# Patient Record
Sex: MALE | Race: WHITE | NOT HISPANIC OR LATINO | Employment: OTHER | URBAN - METROPOLITAN AREA
[De-identification: names, ages, dates, MRNs, and addresses within clinical notes are randomized per-mention and may not be internally consistent; named-entity substitution may affect disease eponyms.]

---

## 2019-11-18 PROBLEM — K50.119 CROHN'S DISEASE OF COLON WITH COMPLICATION: Status: ACTIVE | Noted: 2019-11-18

## 2020-09-23 ENCOUNTER — TELEPHONE (OUTPATIENT)
Dept: PHARMACY | Facility: CLINIC | Age: 60
End: 2020-09-23

## 2020-09-23 NOTE — TELEPHONE ENCOUNTER
No answer/VM to inform patient that [facility] received prescription for Stelara and prior authorization is required.  [facility] will be back in touch once insurance determination is received.

## 2020-09-29 NOTE — TELEPHONE ENCOUNTER
Sathya SWENSON approved under CASE ID [id number] effective 9/23/20 to 12/31/99. $8.95 copay. Forwarding to clinical pharmacist for consult & shipment.

## 2020-12-11 ENCOUNTER — SPECIALTY PHARMACY (OUTPATIENT)
Dept: PHARMACY | Facility: CLINIC | Age: 60
End: 2020-12-11

## 2020-12-11 NOTE — TELEPHONE ENCOUNTER
Patient called about Sathya. Advised patient that pharmacist from inflam team would call for an initial consult and set up shipment. Patient had no other questions or concerns.     Moises Mitchell, PharmD  Clinical Pharmacist  [facility]  P: [phone]

## 2020-12-11 NOTE — TELEPHONE ENCOUNTER
Specialty Pharmacy - Initial Clinical Assessment    Specialty Medication Orders Linked to Encounter      Most Recent Value   Medication #1  ustekinumab (STELARA) 90 mg/mL Syrg syringe (Order#[id number], Rx#[id number])      Stelara for Crohn's disease initial consult completed.   Pt confirmed shipping of Stelara on  to arrive on 20. Address and  verified. $8.95 copay in 004. Pt received infusion dose Stelara on 20 and next dose due 20. Sharps container added to shipment. Pt declined full initial consult with [facility]. He states that he is confident with self injection process since he's been on Humira pens and Cimzia PF syringes. Medication list was reviewed.    Subjective    Rodney Lama is a 60 y.o. male, who is followed by the specialty pharmacy service for management and education.    Recent Encounters     Date Type Provider Description    2020 Specialty Pharmacy Moises Mitchell PharmD Initial Clinical Assessment        Clinical call attempts since last clinical assessment   No call attempts found.     Today he received education before his first fill with [facility].    Current Outpatient Medications   Medication Sig    azaTHIOprine (IMURAN) 50 mg Tab TAKE 3 TABS BY MOUTH ONCE DAILY    esomeprazole (NEXIUM) 40 MG capsule Take 40 mg by mouth before breakfast.    loperamide HCl (IMODIUM A-D ORAL) Take 2 tablets by mouth as needed.    losartan (COZAAR) 50 MG tablet Take 1 tablet by mouth once daily.    multivit-min/FA/lycopen/lutein (CENTRUM SILVER MEN ORAL) Take 1 capsule by mouth once daily.    ondansetron (ZOFRAN) 4 MG tablet Take 4 mg by mouth as needed.    pravastatin (PRAVACHOL) 40 MG tablet     psyllium husk (METAMUCIL) 3.4 gram/5.4 gram Powd Take 1 Dose by mouth as needed.    ustekinumab (STELARA) 90 mg/mL Syrg syringe Inject 1 mL (90 mg total) into the skin every 8 weeks.   Last reviewed on 2020  9:17 AM by Rayne Moreno RN    Review of patient's  allergies indicates:   Allergen Reactions    Penicillins    Last reviewed on  12/11/2020 10:33 AM by Lulu Anders    Drug Interactions    Drug interactions evaluated: yes  Clinically relevant drug interactions identified: no  Provided the patient with educational material regarding drug interactions: not applicable         Adverse Effects    *All other systems reviewed and are negative       Assessment Questions - Documented Responses      Most Recent Value   Assessment   Medication Reconciliation completed for patient  Yes   During the past 4 weeks, has patient missed any activities due to condition or medication?  No   During the past 4 weeks, did patient have any of the following urgent care visits?  None   Goals of Therapy Status  Discussed (new start)   Welcome packet contents reviewed and discussed with patient?  Yes   Assesment completed?  Yes   Plan  Therapy continued   Do you need to open a clinical intervention (i-vent)?  No   Do you want to schedule first shipment?  Yes        Refill Questions - Documented Responses      Most Recent Value   Relationship to patient of person spoken to?  Self   HIPAA/medical authority confirmed?  Yes   Can the patient store medication/sharps container properly (at the correct temperature, away from children/pets, etc.)?  Yes   Can the patient call emergency services (911) in the event of an emergency?  Yes   Does the patient have any concerns or questions about taking or administering this medication as prescribed?  No   During the past 4 weeks, has patient missed any activities due to condition or medication?  No   During the past 4 weeks, did patient have any of the following urgent care visits?  None   How will the patient receive the medication?  Mail   When does the patient need to receive the medication?  12/28/20   Shipping Address  Home   Address in [facility] confirmed and updated if neccessary?  Yes   Expected Copay ($)  8.95   Is the patient able to afford  "the medication copay?  Yes   Payment Method  CC on file   Days supply of Refill  28   Refill activity completed?  Yes   Refill activity plan  Refill scheduled   Shipment/Pickup Date:  12/17/20          Objective    He has a past medical history of Hypertension.    Tried/failed medications: Humira, Cimzia, remicade, entyvio, imuran    BP Readings from Last 4 Encounters:   11/02/20 (!) 141/82   07/31/20 132/66   07/16/20 93/62   06/19/20 137/85     Ht Readings from Last 4 Encounters:   11/02/20 5' 8" (1.727 m)   07/31/20 5' 8" (1.727 m)   06/19/20 5' 8" (1.727 m)   05/22/20 5' 8" (1.727 m)     Wt Readings from Last 4 Encounters:   11/02/20 89.4 kg (197 lb)   07/31/20 88.5 kg (195 lb)   06/19/20 88 kg (194 lb)   05/22/20 89.4 kg (197 lb)     Recent Labs   Lab Result Units 11/02/20  0925   Hepatitis B Surface Ag  Negative     The goals of prescribed drug therapy management include:  · Supporting patient to meet the prescriber's medical treatment objectives  · Improving or maintaining quality of life  · Maintaining optimal therapy adherence  · Minimizing and managing side effects      Goals of Therapy Status: Discussed (new start)    Assessment/Plan  Patient plans to continue CD treatment with Stelara. Infusion dose received 11/2/20.       Indication, dosage, appropriateness, effectiveness, safety and convenience of his specialty medication(s) were reviewed today.     Patient Counseling    Counseled the patient on the following: doses and administration discussed, safe handling, storage, and disposal discussed, possible adverse effects and management discussed, possible drug and prescription drug interactions discussed, possible drug and OTC drug and food interactions discussed, lab monitoring and follow-up discussed, therapeutic rationale discussed, cost of medications and cost implications discussed, adherence and missed doses discussed, pharmacy contact information discussed     [facility] hours of operations and services " reviewed. Refill call in 7 weeks and follow-up assessment with clinical [facility] in 3 months. Pt voiced understanding. Pt reports no issues or side effects since receiving Stelara infusion 11/2/20. He had no further questions or concerns.     Gastro OV note 01/27/2020 and lab 7/31/20 reviewed. Therapy appropriate. No recent labs, to be reviewed after completion in Jan 2021.     Tasks added this encounter   2/15/2021 - Refill Call  3/4/2021 - Clinical - Follow Up Assesement (90 day)   Tasks due within next 3 months   No tasks due.     Lulu Anders, PharmD  [facility] - Specialty Pharmacy  [address] [place] [id number]  Phone: [phone]  Fax: [phone]

## 2021-02-19 ENCOUNTER — SPECIALTY PHARMACY (OUTPATIENT)
Dept: PHARMACY | Facility: CLINIC | Age: 61
End: 2021-02-19

## 2021-04-20 ENCOUNTER — SPECIALTY PHARMACY (OUTPATIENT)
Dept: PHARMACY | Facility: CLINIC | Age: 61
End: 2021-04-20

## 2021-06-17 ENCOUNTER — SPECIALTY PHARMACY (OUTPATIENT)
Dept: PHARMACY | Facility: CLINIC | Age: 61
End: 2021-06-17

## 2021-08-19 ENCOUNTER — SPECIALTY PHARMACY (OUTPATIENT)
Dept: PHARMACY | Facility: CLINIC | Age: 61
End: 2021-08-19

## 2021-08-19 DIAGNOSIS — K50.119 CROHN'S DISEASE OF COLON WITH COMPLICATION: Primary | ICD-10-CM

## 2021-10-20 ENCOUNTER — SPECIALTY PHARMACY (OUTPATIENT)
Dept: PHARMACY | Facility: CLINIC | Age: 61
End: 2021-10-20

## 2021-12-20 ENCOUNTER — SPECIALTY PHARMACY (OUTPATIENT)
Dept: PHARMACY | Facility: CLINIC | Age: 61
End: 2021-12-20

## 2022-02-18 ENCOUNTER — SPECIALTY PHARMACY (OUTPATIENT)
Dept: PHARMACY | Facility: CLINIC | Age: 62
End: 2022-02-18

## 2022-02-18 NOTE — TELEPHONE ENCOUNTER
Specialty Pharmacy - Refill Coordination    Specialty Medication Orders Linked to Encounter    Flowsheet Row Most Recent Value   Medication #1 ustekinumab (STELARA) 90 mg/mL Syrg syringe (Order#[id number], Rx#[id number])          Refill Questions - Documented Responses    Flowsheet Row Most Recent Value   Patient Availability and HIPAA Verification    Does patient want to proceed with activity? Yes   HIPAA/medical authority confirmed? Yes   Relationship to patient of person spoken to? Self   Refill Screening Questions    Changes to allergies? No   Changes to medications? No   New conditions since last clinic visit? No   Unplanned office visit, urgent care, ED, or hospital admission in the last 4 weeks? No   How does patient/caregiver feel medication is working? Good   Financial problems or insurance changes? No   How many doses of your specialty medications were missed in the last 4 weeks? 0   Would patient like to speak to a pharmacist? No   When does the patient need to receive the medication? 02/28/22   Refill Delivery Questions    How will the patient receive the medication? Mail   When does the patient need to receive the medication? 02/28/22   Shipping Address Home   Address in [facility] confirmed and updated if neccessary? Yes   Expected Copay ($) 9.85   Is the patient able to afford the medication copay? Yes   Payment Method CC on file   Days supply of Refill 56   Supplies needed? No supplies needed   Refill activity completed? Yes   Refill activity plan Refill scheduled   Shipment/Pickup Date: 02/23/22          Current Outpatient Medications   Medication Sig    azaTHIOprine (IMURAN) 50 mg Tab TAKE 3 TABS BY MOUTH ONCE DAILY    azathioprine 50 mg/mL Susp as directed    esomeprazole (NEXIUM) 40 MG capsule Take 40 mg by mouth before breakfast.    esomeprazole (NEXIUM) 40 MG capsule 1 capsule    fenofibrate (TRICOR) 145 MG tablet TAKE 1 TAB BY MOUTH ONCE DAILY    loperamide HCl (IMODIUM A-D ORAL)  Take 2 tablets by mouth as needed.    losartan (COZAAR) 50 MG tablet Take 1 tablet by mouth once daily.    losartan (COZAAR) 50 MG tablet 1 tablet    multivit-min/FA/lycopen/lutein (CENTRUM SILVER MEN ORAL) Take 1 capsule by mouth once daily.    pravastatin (PRAVACHOL) 40 MG tablet     pravastatin (PRAVACHOL) 40 MG tablet 1 tablet    psyllium husk 3.4 gram/5.4 gram Powd Take 1 Dose by mouth as needed.    ustekinumab (STELARA) 45 mg/0.5 mL Soln as directed    ustekinumab (STELARA) 90 mg/mL Syrg syringe Inject 1 mL (90 mg total) into the skin every 8 weeks.   Last reviewed on 2/15/2022  8:40 AM by Verena Horner RN    Review of patient's allergies indicates:   Allergen Reactions    Penicillins     Last reviewed on  2/15/2022 8:40 AM by Verena Horner      Tasks added this encounter   No tasks added.   Tasks due within next 3 months   2/18/2022 - Refill Call (Auto Added)     Terell Sabillon [facility] - Specialty Pharmacy  [address] [facility] [address]  Phone: [phone]  Fax: [phone]

## 2022-04-19 ENCOUNTER — SPECIALTY PHARMACY (OUTPATIENT)
Dept: PHARMACY | Facility: CLINIC | Age: 62
End: 2022-04-19

## 2022-04-19 DIAGNOSIS — K50.119 CROHN'S DISEASE OF COLON WITH COMPLICATION: Primary | ICD-10-CM

## 2022-04-19 NOTE — TELEPHONE ENCOUNTER
Specialty Pharmacy - Refill Coordination    Specialty Medication Orders Linked to Encounter    Flowsheet Row Most Recent Value   Medication #1 ustekinumab (STELARA) 90 mg/mL Syrg syringe (Order#[id number], Rx#[id number])          Refill Questions - Documented Responses    Flowsheet Row Most Recent Value   Patient Availability and HIPAA Verification    Does patient want to proceed with activity? Yes   HIPAA/medical authority confirmed? Yes   Relationship to patient of person spoken to? Self   Refill Screening Questions    Changes to allergies? No   Changes to medications? No   New conditions since last clinic visit? Yes  [diagnosed with colon cancer recently but has not started any treatment at this time]   Unplanned office visit, urgent care, ED, or hospital admission in the last 4 weeks? No   How does patient/caregiver feel medication is working? Good   Financial problems or insurance changes? No   How many doses of your specialty medications were missed in the last 4 weeks? 0   Would patient like to speak to a pharmacist? Yes   When does the patient need to receive the medication? 04/25/22   Refill Delivery Questions    How will the patient receive the medication? Mail   When does the patient need to receive the medication? 04/25/22   Shipping Address Home   Address in [facility] confirmed and updated if neccessary? Yes   Expected Copay ($) 0   Is the patient able to afford the medication copay? Yes   Payment Method zero copay   Days supply of Refill 56   Supplies needed? No supplies needed   Refill activity completed? Yes   Refill activity plan Refill scheduled   Shipment/Pickup Date: 04/21/22          Current Outpatient Medications   Medication Sig    azaTHIOprine (IMURAN) 50 mg Tab TAKE 3 TABS BY MOUTH ONCE DAILY    azaTHIOprine (IMURAN) 50 mg Tab as directed    azathioprine 50 mg/mL Susp as directed    esomeprazole (NEXIUM) 40 MG capsule Take 40 mg by mouth before breakfast.    esomeprazole  (NEXIUM) 40 MG capsule 1 capsule    esomeprazole (NEXIUM) 40 MG capsule 1 capsule    fenofibrate (TRICOR) 145 MG tablet TAKE 1 TAB BY MOUTH ONCE DAILY    fenofibrate (TRICOR) 145 MG tablet 1 tablet    loperamide HCl (IMODIUM A-D ORAL) Take 2 tablets by mouth as needed.    losartan (COZAAR) 25 MG tablet Take 50 mg by mouth once daily.    losartan (COZAAR) 50 MG tablet Take 1 tablet by mouth once daily.    losartan (COZAAR) 50 MG tablet 1 tablet    losartan (COZAAR) 50 MG tablet 1 tablet    multivit-min/FA/lycopen/lutein (CENTRUM SILVER MEN ORAL) Take 1 capsule by mouth once daily.    multivitamin-iron-folic acid (CENTRUM) Tab as directed    pravastatin (PRAVACHOL) 40 MG tablet     pravastatin (PRAVACHOL) 40 MG tablet 1 tablet    pravastatin (PRAVACHOL) 40 MG tablet 1 tablet    psyllium husk 3.4 gram/5.4 gram Powd Take 1 Dose by mouth as needed.    ustekinumab (STELARA) 45 mg/0.5 mL Soln as directed    ustekinumab (STELARA) 90 mg/mL Syrg syringe Inject 1 mL (90 mg total) into the skin every 8 weeks.   Last reviewed on 4/19/2022  6:08 PM by Yesenia Zee, PharmD    Review of patient's allergies indicates:   Allergen Reactions    Penicillins Other (See Comments)    Last reviewed on  4/19/2022 6:08 PM by Yesenia Zee      Tasks added this encounter   No tasks added.   Tasks due within next 3 months   4/12/2022 - Refill Call (Auto Added)     Yesenia Zee, PharmD  [facility] - Specialty Pharmacy  [address] [place] [address]  Phone: [phone]  Fax: [phone]

## 2022-05-02 PROBLEM — C20 RECTAL ADENOCARCINOMA: Status: ACTIVE | Noted: 2022-05-02

## 2022-06-09 ENCOUNTER — SPECIALTY PHARMACY (OUTPATIENT)
Dept: PHARMACY | Facility: CLINIC | Age: 62
End: 2022-06-09

## 2022-06-10 NOTE — TELEPHONE ENCOUNTER
Specialty Pharmacy - Clinical Reassessment  Specialty Pharmacy - Clinical Intervention    Specialty Medication Orders Linked to Encounter    Flowsheet Row Most Recent Value   Medication #1 ustekinumab (STELARA) 90 mg/mL Syrg syringe (Order#[id number], Rx#[id number])        Patient Diagnosis   K50.119 - Crohn's disease of colon with complication    Specialty clinical pharmacist review completed for an annual review of reassessment. Reviewed the following areas: current med list, reports of adverse effects, adherence and progress towards therapeutic goals.    Recommendations: Patient recently diagnosed had surgery for adenocarcinoma. No active prescription on file for Stelara 90mg. Dose due next week. Refill request sent.   Patient states that he is doing well after surgery.    Tasks added this encounter   3/10/2023 - Clinical - Follow Up Assesement (Annual)   Tasks due within next 3 months   6/11/2022 - Refill Call (Auto Added)     Maia Garcia, PharmD  [facility]  [address]  [facility] [address]  Phone: [phone]  Fax: [phone]

## 2022-06-14 ENCOUNTER — SPECIALTY PHARMACY (OUTPATIENT)
Dept: PHARMACY | Facility: CLINIC | Age: 62
End: 2022-06-14

## 2022-06-14 NOTE — TELEPHONE ENCOUNTER
Patient recently underwent surgery on 6/3 for rectal adenocarcinoma. Stelara dose is likely due this weekend. Messaged GI provider and surgeon to advise on whether patient is cleared to resume Stelara post-surgery. Patient has a follow up visit with surgeon tomorrow 6/15. Will follow up with patient once I hear from his providers.

## 2022-06-15 NOTE — TELEPHONE ENCOUNTER
GI provider recommends holding Stelara at this time until a long term plan is established for patient's malignancy. Patient's Crohn's is controlled at the moment. Will follow up with patient after visit with surgeon today.

## 2022-06-15 NOTE — TELEPHONE ENCOUNTER
Reached out to patient and advised of the information below. Will touch base again after visit with oncology on 6/27. Patient verbalized understanding.

## 2022-06-30 NOTE — TELEPHONE ENCOUNTER
Per colon surgery provider, patient may restart Stelara, however she is curious about oncology provider's plan for patient after visit on 6/27. Patient rescheduled visit for 6/27 with oncology provider to 7/13. Messaged GI and colon surgery providers to advise on next steps regarding Stelara therapy.

## 2022-07-11 NOTE — TELEPHONE ENCOUNTER
Specialty Pharmacy - Clinical Intervention  Specialty Pharmacy - Refill Coordination    Specialty Medication Orders Linked to Encounter    Flowsheet Row Most Recent Value   Medication #1 ustekinumab (STELARA) 90 mg/mL Syrg syringe (Order#[id number], Rx#[id number])          Refill Questions - Documented Responses    Flowsheet Row Most Recent Value   Patient Availability and HIPAA Verification    Does patient want to proceed with activity? Yes   HIPAA/medical authority confirmed? Yes   Relationship to patient of person spoken to? Self   Refill Screening Questions    Changes to allergies? No   Changes to medications? No   New conditions since last clinic visit? No   Unplanned office visit, urgent care, ED, or hospital admission in the last 4 weeks? No   How does patient/caregiver feel medication is working? Very good   Financial problems or insurance changes? No   How many doses of your specialty medications were missed in the last 4 weeks? 1   Why were doses missed? Other (comment)  [See i-vent]   Would patient like to speak to a pharmacist? No   When does the patient need to receive the medication? 07/12/22   Refill Delivery Questions    How will the patient receive the medication? Mail   When does the patient need to receive the medication? 07/12/22   Shipping Address Home   Address in [facility] confirmed and updated if neccessary? Yes   Expected Copay ($) 0   Is the patient able to afford the medication copay? Yes   Payment Method zero copay   Days supply of Refill 56   Supplies needed? Alcohol Swabs   Refill activity completed? Yes   Refill activity plan Refill scheduled   Shipment/Pickup Date: 07/11/22          Current Outpatient Medications   Medication Sig    ALPRAZolam (XANAX) 0.25 MG tablet Take by mouth 3 (three) times daily.    azaTHIOprine (IMURAN) 50 mg Tab TAKE 3 TABS BY MOUTH ONCE DAILY    esomeprazole (NEXIUM) 40 MG capsule Take 40 mg by mouth before breakfast.    HYDROcodone-acetaminophen  (NORCO)  mg per tablet Take 1 tablet by mouth every 6 (six) hours as needed for Pain. (Patient not taking: Reported on 6/15/2022)    lactulose (CHRONULAC) 10 gram/15 mL solution take 30 ML BY MOUTH 3 TIMES DAILY FOR 3 DAYS take until you have a good bm    loperamide HCl (IMODIUM A-D ORAL) Take 2 tablets by mouth as needed.    losartan (COZAAR) 25 MG tablet Take 50 mg by mouth once daily.    multivit-min/FA/lycopen/lutein (CENTRUM SILVER MEN ORAL) Take 1 capsule by mouth once daily.    multivitamin-iron-folic acid (CENTRUM) Tab as directed    paroxetine (PAXIL) 20 MG tablet Take 20 mg by mouth every morning.    polyethylene glycol (GLYCOLAX) 17 gram PwPk Take 17 g by mouth once daily.    pravastatin (PRAVACHOL) 40 MG tablet     psyllium husk 3.4 gram/5.4 gram Powd Take 1 Dose by mouth as needed.    ustekinumab (STELARA) 90 mg/mL Syrg syringe Inject 1 mL (90 mg total) into the skin every 8 weeks. (Patient not taking: Reported on 6/15/2022)   Last reviewed on 6/15/2022  9:19 AM by Nissa Castro MA    Review of patient's allergies indicates:   Allergen Reactions    Penicillins Other (See Comments)    Last reviewed on  6/15/2022 9:14 AM by Nissa Castro    Interventions added this encounter   Closed: [facility] Provider Intervention - Drug therapy appropriateness: ustekinumab (STELARA) 90 mg/mL Syrg syringe     Tasks added this encounter   8/30/2022 - Refill Call (Auto Added)   Tasks due within next 3 months   No tasks due.     Corona Marlow, PharmD  [facility]  [address] [facility] [address]  Phone: [phone]  Fax: [phone]

## 2022-08-30 ENCOUNTER — SPECIALTY PHARMACY (OUTPATIENT)
Dept: PHARMACY | Facility: CLINIC | Age: 62
End: 2022-08-30

## 2022-08-30 NOTE — TELEPHONE ENCOUNTER
Specialty Pharmacy - Refill Coordination    Specialty Medication Orders Linked to Encounter      Flowsheet Row Most Recent Value   Medication #1 ustekinumab (STELARA) 90 mg/mL Syrg syringe (Order#[id number], Rx#[id number])            Refill Questions - Documented Responses      Flowsheet Row Most Recent Value   Patient Availability and HIPAA Verification    Does patient want to proceed with activity? Yes   HIPAA/medical authority confirmed? Yes   Relationship to patient of person spoken to? Self   Refill Screening Questions    Changes to allergies? No   Changes to medications? No   New conditions since last clinic visit? No   Unplanned office visit, urgent care, ED, or hospital admission in the last 4 weeks? No   How does patient/caregiver feel medication is working? Good   Financial problems or insurance changes? No   How many doses of your specialty medications were missed in the last 4 weeks? 0   Would patient like to speak to a pharmacist? No   When does the patient need to receive the medication? 09/09/22   Refill Delivery Questions    How will the patient receive the medication? Mail   When does the patient need to receive the medication? 09/09/22   Shipping Address Home   Address in [facility] confirmed and updated if neccessary? Yes   Expected Copay ($) 0   Is the patient able to afford the medication copay? Yes   Payment Method zero copay   Days supply of Refill 56   Supplies needed? No supplies needed   Refill activity completed? Yes   Refill activity plan Refill scheduled   Shipment/Pickup Date: 09/01/22            Current Outpatient Medications   Medication Sig    ALPRAZolam (XANAX) 0.25 MG tablet Take by mouth 3 (three) times daily.    azaTHIOprine (IMURAN) 50 mg Tab TAKE 3 TABS BY MOUTH ONCE DAILY    esomeprazole (NEXIUM) 40 MG capsule Take 40 mg by mouth before breakfast.    HYDROcodone-acetaminophen (NORCO)  mg per tablet Take 1 tablet by mouth every 6 (six) hours as needed for Pain.  (Patient not taking: Reported on 6/15/2022)    lactulose (CHRONULAC) 10 gram/15 mL solution take 30 ML BY MOUTH 3 TIMES DAILY FOR 3 DAYS take until you have a good bm    loperamide HCl (IMODIUM A-D ORAL) Take 2 tablets by mouth as needed.    losartan (COZAAR) 25 MG tablet Take 50 mg by mouth once daily.    multivit-min/FA/lycopen/lutein (CENTRUM SILVER MEN ORAL) Take 1 capsule by mouth once daily.    multivitamin-iron-folic acid (CENTRUM) Tab as directed    paroxetine (PAXIL) 20 MG tablet Take 20 mg by mouth every morning.    polyethylene glycol (GLYCOLAX) 17 gram PwPk Take 17 g by mouth once daily.    pravastatin (PRAVACHOL) 40 MG tablet     psyllium husk 3.4 gram/5.4 gram Powd Take 1 Dose by mouth as needed.    ustekinumab (STELARA) 90 mg/mL Syrg syringe Inject 1 mL (90 mg total) into the skin every 8 weeks. (Patient not taking: Reported on 6/15/2022)   Last reviewed on 6/15/2022  9:19 AM by Nissa Castro MA    Review of patient's allergies indicates:   Allergen Reactions    Penicillins Other (See Comments)    Last reviewed on  6/15/2022 9:14 AM by Nissa Castro    Pt did require some prompting to recall last dose that was injected.     Tasks added this encounter   10/22/2022 - Refill Call (Auto Added)   Tasks due within next 3 months   No tasks due.     Francesca Brown, PharmD  [facility]  [facility] [address]  [facility] [address]  Phone: [phone]  Fax: [phone]

## 2022-10-24 ENCOUNTER — SPECIALTY PHARMACY (OUTPATIENT)
Dept: PHARMACY | Facility: CLINIC | Age: 62
End: 2022-10-24

## 2022-10-24 NOTE — TELEPHONE ENCOUNTER
Specialty Pharmacy - Refill Coordination    Specialty Medication Orders Linked to Encounter      Flowsheet Row Most Recent Value   Medication #1 ustekinumab (STELARA) 90 mg/mL Syrg syringe (Order#[id number], Rx#[id number])            Refill Questions - Documented Responses      Flowsheet Row Most Recent Value   Patient Availability and HIPAA Verification    Does patient want to proceed with activity? Yes   HIPAA/medical authority confirmed? Yes   Relationship to patient of person spoken to? Self   Refill Screening Questions    Changes to allergies? No   Changes to medications? No   New conditions since last clinic visit? No   Unplanned office visit, urgent care, ED, or hospital admission in the last 4 weeks? No   How does patient/caregiver feel medication is working? Good   Financial problems or insurance changes? No   How many doses of your specialty medications were missed in the last 4 weeks? 0   Would patient like to speak to a pharmacist? No   When does the patient need to receive the medication? 10/30/22   Refill Delivery Questions    How will the patient receive the medication? Mail   When does the patient need to receive the medication? 10/30/22   Shipping Address Home   Address in [facility] confirmed and updated if neccessary? Yes   Expected Copay ($) 0   Is the patient able to afford the medication copay? Yes   Payment Method zero copay   Days supply of Refill 56   Supplies needed? No supplies needed   Refill activity completed? Yes   Refill activity plan Refill scheduled   Shipment/Pickup Date: 10/26/22            Current Outpatient Medications   Medication Sig    ALPRAZolam (XANAX) 0.25 MG tablet Take by mouth 3 (three) times daily.    azaTHIOprine (IMURAN) 50 mg Tab TAKE 3 TABS BY MOUTH ONCE DAILY    esomeprazole (NEXIUM) 40 MG capsule Take 40 mg by mouth before breakfast.    HYDROcodone-acetaminophen (NORCO)  mg per tablet Take 1 tablet by mouth every 6 (six) hours as needed for Pain.  (Patient not taking: Reported on 6/15/2022)    lactulose (CHRONULAC) 10 gram/15 mL solution take 30 ML BY MOUTH 3 TIMES DAILY FOR 3 DAYS take until you have a good bm    loperamide HCl (IMODIUM A-D ORAL) Take 2 tablets by mouth as needed.    losartan (COZAAR) 25 MG tablet Take 50 mg by mouth once daily.    multivit-min/FA/lycopen/lutein (CENTRUM SILVER MEN ORAL) Take 1 capsule by mouth once daily.    multivitamin-iron-folic acid (CENTRUM) Tab as directed    paroxetine (PAXIL) 20 MG tablet Take 20 mg by mouth every morning.    polyethylene glycol (GLYCOLAX) 17 gram PwPk Take 17 g by mouth once daily.    pravastatin (PRAVACHOL) 40 MG tablet     psyllium husk 3.4 gram/5.4 gram Powd Take 1 Dose by mouth as needed.    ustekinumab (STELARA) 90 mg/mL Syrg syringe Inject 1 mL (90 mg total) into the skin every 8 weeks. (Patient not taking: Reported on 6/15/2022)   Last reviewed on 6/15/2022  9:19 AM by Nissa Castro MA    Review of patient's allergies indicates:   Allergen Reactions    Penicillins Other (See Comments)    Last reviewed on  6/15/2022 9:14 AM by Nissa Castro      Tasks added this encounter   12/16/2022 - Refill Call (Auto Added)   Tasks due within next 3 months   No tasks due.     Nieves Martell, PharmD  [facility]  [address] [place], [place] [address]  [facility] [address]  Phone: [phone]  Fax: [phone]

## 2022-12-16 ENCOUNTER — SPECIALTY PHARMACY (OUTPATIENT)
Dept: PHARMACY | Facility: CLINIC | Age: 62
End: 2022-12-16

## 2022-12-16 NOTE — TELEPHONE ENCOUNTER
Specialty Pharmacy - Refill Coordination    Specialty Medication Orders Linked to Encounter      Flowsheet Row Most Recent Value   Medication #1 ustekinumab (STELARA) 90 mg/mL Syrg syringe (Order#[id number], Rx#[id number])            Refill Questions - Documented Responses      Flowsheet Row Most Recent Value   Patient Availability and HIPAA Verification    Does patient want to proceed with activity? Yes   HIPAA/medical authority confirmed? Yes   Relationship to patient of person spoken to? Self   Refill Screening Questions    Changes to allergies? No   Changes to medications? No   New conditions since last clinic visit? No   Unplanned office visit, urgent care, ED, or hospital admission in the last 4 weeks? No   How does patient/caregiver feel medication is working? Good   Financial problems or insurance changes? No   How many doses of your specialty medications were missed in the last 4 weeks? 0   Would patient like to speak to a pharmacist? No   When does the patient need to receive the medication? 12/23/22   Refill Delivery Questions    How will the patient receive the medication? Mail   When does the patient need to receive the medication? 12/23/22   Shipping Address Home   Address in [facility] confirmed and updated if neccessary? Yes   Expected Copay ($) 0   Is the patient able to afford the medication copay? Yes   Payment Method zero copay   Days supply of Refill 56   Supplies needed? No supplies needed   Refill activity completed? Yes   Refill activity plan Refill scheduled   Shipment/Pickup Date: 12/20/22            Current Outpatient Medications   Medication Sig    ALPRAZolam (XANAX) 0.25 MG tablet Take by mouth 3 (three) times daily.    azaTHIOprine (IMURAN) 50 mg Tab TAKE 3 TABS BY MOUTH ONCE DAILY    esomeprazole (NEXIUM) 40 MG capsule Take 40 mg by mouth before breakfast.    HYDROcodone-acetaminophen (NORCO)  mg per tablet Take 1 tablet by mouth every 6 (six) hours as needed for Pain.  (Patient not taking: Reported on 6/15/2022)    lactulose (CHRONULAC) 10 gram/15 mL solution take 30 ML BY MOUTH 3 TIMES DAILY FOR 3 DAYS take until you have a good bm    loperamide HCl (IMODIUM A-D ORAL) Take 2 tablets by mouth as needed.    losartan (COZAAR) 25 MG tablet Take 50 mg by mouth once daily.    multivit-min/FA/lycopen/lutein (CENTRUM SILVER MEN ORAL) Take 1 capsule by mouth once daily.    multivitamin-iron-folic acid (CENTRUM) Tab as directed    paroxetine (PAXIL) 20 MG tablet Take 20 mg by mouth every morning.    polyethylene glycol (GLYCOLAX) 17 gram PwPk Take 17 g by mouth once daily.    pravastatin (PRAVACHOL) 40 MG tablet     psyllium husk 3.4 gram/5.4 gram Powd Take 1 Dose by mouth as needed.    ustekinumab (STELARA) 90 mg/mL Syrg syringe Inject 1 mL (90 mg total) into the skin every 8 weeks. (Patient not taking: Reported on 6/15/2022)   Last reviewed on 6/15/2022  9:19 AM by Nissa Castro MA    Review of patient's allergies indicates:   Allergen Reactions    Penicillins Other (See Comments)    Last reviewed on  6/15/2022 9:14 AM by Nissa Castro      Tasks added this encounter   2/7/2023 - Refill Call (Auto Added)   Tasks due within next 3 months   3/10/2023 - Clinical - Follow Up Assesement (Annual)     [facility]  [address] [place], [place] [address]  [facility] [address]  Phone: [phone]  Fax: [phone]

## 2023-02-07 ENCOUNTER — SPECIALTY PHARMACY (OUTPATIENT)
Dept: PHARMACY | Facility: CLINIC | Age: 63
End: 2023-02-07

## 2023-02-07 NOTE — TELEPHONE ENCOUNTER
Specialty Pharmacy - Refill Coordination    Specialty Medication Orders Linked to Encounter      Flowsheet Row Most Recent Value   Medication #1 ustekinumab (STELARA) 90 mg/mL Syrg syringe (Order#[id number], Rx#[id number])            Refill Questions - Documented Responses      Flowsheet Row Most Recent Value   Patient Availability and HIPAA Verification    Does patient want to proceed with activity? Yes   HIPAA/medical authority confirmed? Yes   Relationship to patient of person spoken to? Self   Refill Screening Questions    Changes to allergies? No   Changes to medications? No   New conditions since last clinic visit? No   Unplanned office visit, urgent care, ED, or hospital admission in the last 4 weeks? No   How does patient/caregiver feel medication is working? Good   Financial problems or insurance changes? No   How many doses of your specialty medications were missed in the last 4 weeks? 0   Would patient like to speak to a pharmacist? No   When does the patient need to receive the medication? 02/14/23   Refill Delivery Questions    How will the patient receive the medication? Mail   When does the patient need to receive the medication? 02/14/23   Shipping Address Home   Address in [facility] confirmed and updated if neccessary? Yes   Expected Copay ($) 10.35   Is the patient able to afford the medication copay? Yes   Payment Method CC on file   Days supply of Refill 56   Supplies needed? No supplies needed   Refill activity completed? Yes   Refill activity plan Refill scheduled   Shipment/Pickup Date: 02/09/23            Current Outpatient Medications   Medication Sig    ALPRAZolam (XANAX) 0.25 MG tablet Take by mouth 3 (three) times daily.    azaTHIOprine (IMURAN) 50 mg Tab TAKE 3 TABS BY MOUTH ONCE DAILY    esomeprazole (NEXIUM) 40 MG capsule Take 40 mg by mouth before breakfast.    HYDROcodone-acetaminophen (NORCO)  mg per tablet Take 1 tablet by mouth every 6 (six) hours as needed for Pain.  (Patient not taking: Reported on 6/15/2022)    lactulose (CHRONULAC) 10 gram/15 mL solution take 30 ML BY MOUTH 3 TIMES DAILY FOR 3 DAYS take until you have a good bm    loperamide HCl (IMODIUM A-D ORAL) Take 2 tablets by mouth as needed.    losartan (COZAAR) 25 MG tablet Take 50 mg by mouth once daily.    multivit-min/FA/lycopen/lutein (CENTRUM SILVER MEN ORAL) Take 1 capsule by mouth once daily.    multivitamin-iron-folic acid (CENTRUM) Tab as directed    paroxetine (PAXIL) 20 MG tablet Take 20 mg by mouth every morning.    polyethylene glycol (GLYCOLAX) 17 gram PwPk Take 17 g by mouth once daily.    pravastatin (PRAVACHOL) 40 MG tablet     psyllium husk 3.4 gram/5.4 gram Powd Take 1 Dose by mouth as needed.    ustekinumab (STELARA) 90 mg/mL Syrg syringe Inject 1 mL (90 mg total) into the skin every 8 weeks. (Patient not taking: Reported on 6/15/2022)   Last reviewed on 6/15/2022  9:19 AM by Nissa Castro MA    Review of patient's allergies indicates:   Allergen Reactions    Penicillins Other (See Comments)    Last reviewed on  6/15/2022 9:14 AM by Nissa Castro      Tasks added this encounter   4/1/2023 - Refill Call (Auto Added)   Tasks due within next 3 months   3/10/2023 - Clinical - Follow Up Assesement (Annual)     Nieves Martell, PharmD  [facility]  [address] [place], [place] [address]  [facility] [address]  Phone: [phone]  Fax: [phone]

## 2023-04-03 ENCOUNTER — SPECIALTY PHARMACY (OUTPATIENT)
Dept: PHARMACY | Facility: CLINIC | Age: 63
End: 2023-04-03

## 2023-04-03 NOTE — TELEPHONE ENCOUNTER
Specialty Pharmacy - Clinical Reassessment    Specialty Medication Orders Linked to Encounter      Flowsheet Row Most Recent Value   Medication #1 ustekinumab (STELARA) 90 mg/mL Syrg syringe (Order#[id number], Rx#[id number])          Patient Diagnosis   K50.119 - Crohn's disease of colon with complication    Rodney Lama is a 62 y.o. male, who is followed by the specialty pharmacy service for management and education of his Stelara.  He has been on therapy with Stelara for 2.5 years.  I have reviewed his electronic medical record and current medication list and determined that specialty medication adjustment Is not needed at this time.    Patient has not experienced adverse events.  He Is adherent reporting 1 missed doses since last review.  Adherence has been encouraged with the following mechanism(s): proactive refill calls.  He is meeting goals of therapy and will continue treatment.        2/7/2023 12/16/2022 10/24/2022 8/30/2022 7/11/2022 4/19/2022 2/18/2022   Follow Up Review   # of missed doses 0 0 0 0 1 0 0   Reason     Other (comment)     New Medications? No No No No No No No   New Conditions? No No No No No Yes No   New Allergies? No No No No No No No   Med Effective? Good Good Good Good Very good Good Good   Urgent Care? No No No No No No No   Requested Pharmacist? No No No No No Yes No            Therapy is appropriate to continue.    Therapy is effective: Yes  On scale of 1 to 10, how does patient rank quality of life? (10 - Best): Unable to Assess  Recommendations:  Due for follow up/labs; remind patient to contact GI clinic to schedule an appt.  Review Method: Chart Review    Tasks added this encounter   1/3/2024 - Clinical - Follow Up Assesement (Annual)   Tasks due within next 3 months   4/1/2023 - Refill Call (Auto Added)     Corona Marlow, PharmD  [facility]  [address] [place] [address]  Phone: [phone]  Fax: [phone]

## 2023-04-06 ENCOUNTER — SPECIALTY PHARMACY (OUTPATIENT)
Dept: PHARMACY | Facility: CLINIC | Age: 63
End: 2023-04-06

## 2023-04-06 NOTE — TELEPHONE ENCOUNTER
Specialty Pharmacy - Refill Coordination    Specialty Medication Orders Linked to Encounter      Flowsheet Row Most Recent Value   Medication #1 ustekinumab (STELARA) 90 mg/mL Syrg syringe (Order#[id number], Rx#[id number])            Refill Questions - Documented Responses      Flowsheet Row Most Recent Value   Patient Availability and HIPAA Verification    Does patient want to proceed with activity? Yes   HIPAA/medical authority confirmed? Yes   Relationship to patient of person spoken to? Self   Refill Screening Questions    Changes to allergies? No   Changes to medications? No   New conditions since last clinic visit? No   Unplanned office visit, urgent care, ED, or hospital admission in the last 4 weeks? No   How does patient/caregiver feel medication is working? Very good   Financial problems or insurance changes? No   How many doses of your specialty medications were missed in the last 4 weeks? 0   Would patient like to speak to a pharmacist? No   When does the patient need to receive the medication? 04/08/23   Refill Delivery Questions    How will the patient receive the medication? Mail   When does the patient need to receive the medication? 04/08/23   Shipping Address Home   Address in [facility] confirmed and updated if neccessary? Yes   Expected Copay ($) 0   Is the patient able to afford the medication copay? Yes   Payment Method zero copay   Days supply of Refill 56   Supplies needed? Alcohol Swabs   Refill activity completed? Yes   Refill activity plan Refill scheduled   Shipment/Pickup Date: 04/06/23            Current Outpatient Medications   Medication Sig    ALPRAZolam (XANAX) 0.25 MG tablet Take by mouth 3 (three) times daily.    azaTHIOprine (IMURAN) 50 mg Tab TAKE 3 TABS BY MOUTH ONCE DAILY    esomeprazole (NEXIUM) 40 MG capsule Take 40 mg by mouth before breakfast.    losartan (COZAAR) 25 MG tablet Take 50 mg by mouth once daily.    multivit-min/FA/lycopen/lutein (CENTRUM SILVER MEN  ORAL) Take 1 capsule by mouth once daily.    multivitamin-iron-folic acid (CENTRUM) Tab as directed    paroxetine (PAXIL) 20 MG tablet Take 20 mg by mouth every morning.    polyethylene glycol (GLYCOLAX) 17 gram PwPk Take 17 g by mouth once daily.    pravastatin (PRAVACHOL) 40 MG tablet     psyllium husk 3.4 gram/5.4 gram Powd Take 1 Dose by mouth as needed.    ustekinumab (STELARA) 90 mg/mL Syrg syringe Inject 1 mL (90 mg total) into the skin every 8 weeks.   Last reviewed on 4/3/2023 11:32 AM by Corona Marlow PharmD    Review of patient's allergies indicates:   Allergen Reactions    Penicillins Other (See Comments)    Last reviewed on  4/3/2023 11:31 AM by Corona Marlow      Tasks added this encounter   5/27/2023 - Refill Call (Auto Added)   Tasks due within next 3 months   No tasks due.     Tremayne MastersonD  [facility]  [address] [place] [address]  Phone: [phone]  Fax: [phone]

## 2023-06-06 ENCOUNTER — SPECIALTY PHARMACY (OUTPATIENT)
Dept: PHARMACY | Facility: CLINIC | Age: 63
End: 2023-06-06

## 2023-06-06 NOTE — TELEPHONE ENCOUNTER
Specialty Pharmacy - Refill Coordination    Specialty Medication Orders Linked to Encounter      Flowsheet Row Most Recent Value   Medication #1 ustekinumab (STELARA) 90 mg/mL Syrg syringe (Order#[id number], Rx#[id number])          Refill Questions - Documented Responses      Flowsheet Row Most Recent Value   Patient Availability and HIPAA Verification    Does patient want to proceed with activity? Unable to Reach          We have made multiple attempts to the patient and have been unsuccessful to reach the patient. We will stop reaching out to the patient but in the event that the patient needs the med and contacts us, we will communicate and begin dispensing for the patient. At your next visit with the patient, please review the importance of being in contact with our specialty pharmacy as a part of our care team.    Interventions added this encounter   Closed: [facility] Provider Intervention - Drug therapy adherence: ustekinumab (STELARA) 90 mg/mL Syrg syringe     Corona Marlow, PharmD  [facility]  [address] [place] [address]  Phone: [phone]  Fax: [phone]

## 2023-06-06 NOTE — TELEPHONE ENCOUNTER
Specialty Pharmacy - Refill Coordination    Specialty Medication Orders Linked to Encounter      Flowsheet Row Most Recent Value   Medication #1 ustekinumab (STELARA) 90 mg/mL Syrg syringe (Order#[id number], Rx#[id number])                Current Outpatient Medications   Medication Sig    ALPRAZolam (XANAX) 0.25 MG tablet 1 tablet Orally Twice a day    azaTHIOprine (IMURAN) 50 mg Tab TAKE 3 TABS BY MOUTH ONCE DAILY (Patient not taking: Reported on 5/12/2023)    cyproheptadine (PERIACTIN) 4 mg tablet Take 4 mg by mouth 3 (three) times daily.    esomeprazole (NEXIUM) 40 MG capsule Take 40 mg by mouth before breakfast.    fenofibrate (TRICOR) 145 MG tablet 1 tablet Orally Once a day for 30 day(s)    gabapentin (NEURONTIN) 300 MG capsule TAKE 1 TAB BY MOUTH AT BEDTIME Oral for 30 Days    hydrOXYzine pamoate (VISTARIL) 50 MG Cap TAKE 1 CAP BY MOUTH ONCE DAILY AT BEDTIME for 30    LORazepam (ATIVAN) 1 MG tablet 1 tablet at bedtime as needed Orally Once a day    losartan (COZAAR) 50 MG tablet Take 50 mg by mouth.    mirtazapine (REMERON) 30 MG tablet Take 30 mg by mouth every evening.    multivit-min/FA/lycopen/lutein (CENTRUM SILVER MEN ORAL) Take 1 capsule by mouth once daily.    multivitamin-iron-folic acid (CENTRUM) Tab as directed    paroxetine (PAXIL) 20 MG tablet Take 20 mg by mouth every morning.    polyethylene glycol (GLYCOLAX) 17 gram PwPk Take 17 g by mouth once daily. (Patient not taking: Reported on 5/12/2023)    pravastatin (PRAVACHOL) 40 MG tablet TAKE 1 TAB BY MOUTH ONCE DAILY FOR CHOLESTEROL Orally Once a day for 30 days    psyllium husk 0.4 gram Cap 1 TAB PO DAILY for 90 days    ustekinumab (STELARA) 90 mg/mL Syrg syringe Inject 1 mL (90 mg total) into the skin every 8 weeks.   Last reviewed on 5/12/2023  1:47 PM by Erika Alfaro MA    Review of patient's allergies indicates:   Allergen Reactions    Penicillins Other (See Comments)    Last reviewed on  5/12/2023 1:42 PM by Erika Hartman  added this encounter   Closed: [facility] Provider Intervention - Drug therapy adherence: ustekinumab (STELARA) 90 mg/mL Syrg syringe     Tasks added this encounter   No tasks added.   Tasks due within next 3 months   6/13/2023 - Refill Coordination Outreach (1 time occurrence)     Corona Marlow, PharmD  [facility]  [address] [place], [place] [address]  [facility] [address]  Phone: [phone]  Fax: [phone]

## 2023-06-06 NOTE — TELEPHONE ENCOUNTER
Reminded patient [facility] makes 3 call attempts for refills and to please call pharmacy back if unable to answer. Patient states he was busy with other things, patient will inject medication when received.     Specialty Pharmacy - Refill Coordination    Specialty Medication Orders Linked to Encounter      Flowsheet Row Most Recent Value   Medication #1 ustekinumab (STELARA) 90 mg/mL Syrg syringe (Order#[id number], Rx#[id number])            Refill Questions - Documented Responses      Flowsheet Row Most Recent Value   Refill Screening Questions    Changes to allergies? No   Changes to medications? No   New conditions since last clinic visit? No   Unplanned office visit, urgent care, ED, or hospital admission in the last 4 weeks? No   How does patient/caregiver feel medication is working? Very good   Financial problems or insurance changes? No   How many doses of your specialty medications were missed in the last 4 weeks? 0  [patient will be 4 days late on next dose]   Why were doses missed? Busy with other things   Would patient like to speak to a pharmacist? No   When does the patient need to receive the medication? 06/07/23   Refill Delivery Questions    How will the patient receive the medication? Mail   When does the patient need to receive the medication? 06/07/23   Shipping Address Home   Address in [facility] confirmed and updated if neccessary? Yes   Expected Copay ($) 0   Is the patient able to afford the medication copay? Yes   Payment Method zero copay   Days supply of Refill 56   Supplies needed? No supplies needed   Refill activity completed? Yes   Refill activity plan Refill scheduled   Shipment/Pickup Date: 06/06/23            Current Outpatient Medications   Medication Sig    ALPRAZolam (XANAX) 0.25 MG tablet 1 tablet Orally Twice a day    azaTHIOprine (IMURAN) 50 mg Tab TAKE 3 TABS BY MOUTH ONCE DAILY (Patient not taking: Reported on 5/12/2023)    cyproheptadine (PERIACTIN) 4 mg tablet Take 4 mg by  mouth 3 (three) times daily.    esomeprazole (NEXIUM) 40 MG capsule Take 40 mg by mouth before breakfast.    fenofibrate (TRICOR) 145 MG tablet 1 tablet Orally Once a day for 30 day(s)    gabapentin (NEURONTIN) 300 MG capsule TAKE 1 TAB BY MOUTH AT BEDTIME Oral for 30 Days    hydrOXYzine pamoate (VISTARIL) 50 MG Cap TAKE 1 CAP BY MOUTH ONCE DAILY AT BEDTIME for 30    LORazepam (ATIVAN) 1 MG tablet 1 tablet at bedtime as needed Orally Once a day    losartan (COZAAR) 50 MG tablet Take 50 mg by mouth.    mirtazapine (REMERON) 30 MG tablet Take 30 mg by mouth every evening.    multivit-min/FA/lycopen/lutein (CENTRUM SILVER MEN ORAL) Take 1 capsule by mouth once daily.    multivitamin-iron-folic acid (CENTRUM) Tab as directed    paroxetine (PAXIL) 20 MG tablet Take 20 mg by mouth every morning.    polyethylene glycol (GLYCOLAX) 17 gram PwPk Take 17 g by mouth once daily. (Patient not taking: Reported on 5/12/2023)    pravastatin (PRAVACHOL) 40 MG tablet TAKE 1 TAB BY MOUTH ONCE DAILY FOR CHOLESTEROL Orally Once a day for 30 days    psyllium husk 0.4 gram Cap 1 TAB PO DAILY for 90 days    ustekinumab (STELARA) 90 mg/mL Syrg syringe Inject 1 mL (90 mg total) into the skin every 8 weeks.   Last reviewed on 5/12/2023  1:47 PM by Erika Alfaro MA    Review of patient's allergies indicates:   Allergen Reactions    Penicillins Other (See Comments)    Last reviewed on  5/12/2023 1:42 PM by Erika Alfaro      Tasks added this encounter   No tasks added.   Tasks due within next 3 months   6/13/2023 - Refill Coordination Outreach (1 time occurrence)     Damaris Reyes, PharmD  [facility]  [address] [place] [address]  Phone: [phone]  Fax: [phone]

## 2023-07-25 ENCOUNTER — SPECIALTY PHARMACY (OUTPATIENT)
Dept: PHARMACY | Facility: CLINIC | Age: 63
End: 2023-07-25